# Patient Record
(demographics unavailable — no encounter records)

---

## 2024-11-05 NOTE — PLAN
[FreeTextEntry1] : 30 yo with fibrocystic breasts - f/u bilateral breast sonogram  2- PCOS - pt counselled that given normal weight and resumption of regular menstrual cycle, it does not appear that she is insulin resistant and needs Metformin at this time - A1C can be drawn but may be decreased due to recent usage of GLP1 - can f/u for repeat labs in 3 mos in office vs with PCP for further managment

## 2024-11-05 NOTE — HISTORY OF PRESENT ILLNESS
[FreeTextEntry1] : 32 yo P0 with h/o fibrocystic breasts family h/o breast ca with neg workup per patient (will send in records) and PCOS presents with   1- palpable new L breast mass. Pt states she did not feel it, but her partner stated she felt something new. no tenderness or skin changes  2- PCOS- pt s/p Wegovy and Zepbound. Lost 40 lbs with return of regular menses. Pt stopped 9/2024 due to loss of hair. Pt has maintained weight loss, is active and watches nutrition  Pt wants to know if she is a candidate for Metformin